# Patient Record
Sex: MALE | Race: BLACK OR AFRICAN AMERICAN | NOT HISPANIC OR LATINO | Employment: UNEMPLOYED | URBAN - METROPOLITAN AREA
[De-identification: names, ages, dates, MRNs, and addresses within clinical notes are randomized per-mention and may not be internally consistent; named-entity substitution may affect disease eponyms.]

---

## 2017-11-09 ENCOUNTER — HOSPITAL ENCOUNTER (EMERGENCY)
Facility: HOSPITAL | Age: 22
Discharge: HOME | End: 2017-11-09
Attending: EMERGENCY MEDICINE
Payer: MEDICAID

## 2017-11-09 VITALS
RESPIRATION RATE: 18 BRPM | TEMPERATURE: 99 F | BODY MASS INDEX: 20.32 KG/M2 | WEIGHT: 150 LBS | HEART RATE: 82 BPM | OXYGEN SATURATION: 100 % | SYSTOLIC BLOOD PRESSURE: 115 MMHG | DIASTOLIC BLOOD PRESSURE: 71 MMHG | HEIGHT: 72 IN

## 2017-11-09 DIAGNOSIS — S63.115A CLOSED DISLOCATION OF METACARPOPHALANGEAL JOINT OF LEFT THUMB, INITIAL ENCOUNTER: Primary | ICD-10-CM

## 2017-11-09 PROCEDURE — 63600175 PHARM REV CODE 636 W HCPCS: Performed by: NURSE PRACTITIONER

## 2017-11-09 PROCEDURE — 25000003 PHARM REV CODE 250: Performed by: EMERGENCY MEDICINE

## 2017-11-09 PROCEDURE — 99283 EMERGENCY DEPT VISIT LOW MDM: CPT | Mod: 25

## 2017-11-09 PROCEDURE — 26641 TREAT THUMB DISLOCATION: CPT | Mod: FA

## 2017-11-09 PROCEDURE — 26700 TREAT KNUCKLE DISLOCATION: CPT

## 2017-11-09 PROCEDURE — 96372 THER/PROPH/DIAG INJ SC/IM: CPT

## 2017-11-09 PROCEDURE — 25000003 PHARM REV CODE 250: Performed by: NURSE PRACTITIONER

## 2017-11-09 RX ORDER — MORPHINE SULFATE 10 MG/ML
4 INJECTION INTRAMUSCULAR; INTRAVENOUS; SUBCUTANEOUS
Status: COMPLETED | OUTPATIENT
Start: 2017-11-09 | End: 2017-11-09

## 2017-11-09 RX ORDER — HYDROCODONE BITARTRATE AND ACETAMINOPHEN 5; 325 MG/1; MG/1
1 TABLET ORAL EVERY 4 HOURS PRN
Qty: 8 TABLET | Refills: 0 | Status: SHIPPED | OUTPATIENT
Start: 2017-11-09

## 2017-11-09 RX ORDER — LIDOCAINE HYDROCHLORIDE 10 MG/ML
1 INJECTION INFILTRATION; PERINEURAL ONCE
Status: COMPLETED | OUTPATIENT
Start: 2017-11-09 | End: 2017-11-09

## 2017-11-09 RX ORDER — NAPROXEN 500 MG/1
500 TABLET ORAL 2 TIMES DAILY PRN
Qty: 20 TABLET | Refills: 0 | Status: SHIPPED | OUTPATIENT
Start: 2017-11-09

## 2017-11-09 RX ORDER — OXYCODONE AND ACETAMINOPHEN 5; 325 MG/1; MG/1
1 TABLET ORAL
Status: COMPLETED | OUTPATIENT
Start: 2017-11-09 | End: 2017-11-09

## 2017-11-09 RX ADMIN — LIDOCAINE HYDROCHLORIDE 1 ML: 10 INJECTION, SOLUTION INFILTRATION; PERINEURAL at 10:11

## 2017-11-09 RX ADMIN — MORPHINE SULFATE 4 MG: 10 INJECTION, SOLUTION INTRAMUSCULAR; INTRAVENOUS at 10:11

## 2017-11-09 RX ADMIN — OXYCODONE AND ACETAMINOPHEN 1 TABLET: 5; 325 TABLET ORAL at 09:11

## 2017-11-10 NOTE — ED PROVIDER NOTES
Encounter Date: 11/9/2017    SCRIBE #1 NOTE: I, Nilton Inge, am scribing for, and in the presence of,  Anastacio Lee NP. I have scribed the following portions of the note - Other sections scribed: HPI and ROS.       History     Chief Complaint   Patient presents with    Hand Pain     tripped on the couch and fell into a table. LEFT HAND swelling noted     CC: Hand Pain    HPI: This 22 y.o. male with no previous medical history presents to ED c/o left hand pain with associated joint swelling. Patient reports that he was in his home when he tripped on the sofa and landed on the table, impacting his thumb. No treatment attempted. No known medical allergies.      The history is provided by the patient. No  was used.     Review of patient's allergies indicates:  No Known Allergies  History reviewed. No pertinent past medical history.  History reviewed. No pertinent surgical history.  History reviewed. No pertinent family history.  Social History   Substance Use Topics    Smoking status: Never Smoker    Smokeless tobacco: Never Used    Alcohol use No     Review of Systems   Constitutional: Negative for diaphoresis and fever.   HENT: Negative for sore throat.    Eyes: Negative for visual disturbance.   Respiratory: Negative for cough and shortness of breath.    Cardiovascular: Negative for chest pain.   Gastrointestinal: Negative for abdominal pain, diarrhea, nausea and vomiting.   Genitourinary: Negative for dysuria.   Musculoskeletal: Negative for back pain.        (+) left hand pain   Skin: Negative for rash.   Neurological: Negative for weakness and headaches.   Hematological: Does not bruise/bleed easily.       Physical Exam     Initial Vitals [11/09/17 1943]   BP Pulse Resp Temp SpO2   126/64 99 18 99.5 °F (37.5 °C) 99 %      MAP       84.67         Physical Exam    Nursing note and vitals reviewed.  Constitutional: He appears well-developed and well-nourished. He is not diaphoretic. No  distress.   HENT:   Head: Normocephalic and atraumatic.   Right Ear: External ear normal.   Left Ear: External ear normal.   Nose: Nose normal.   Eyes: Conjunctivae and EOM are normal. Pupils are equal, round, and reactive to light. Right eye exhibits no discharge. Left eye exhibits no discharge. No scleral icterus.   Neck: Normal range of motion. Neck supple. No thyromegaly present. No tracheal deviation present. No JVD present.   Cardiovascular: Normal rate, regular rhythm, normal heart sounds and intact distal pulses. Exam reveals no gallop and no friction rub.    No murmur heard.  Pulmonary/Chest: Breath sounds normal. No stridor. No respiratory distress. He has no wheezes. He has no rhonchi. He has no rales. He exhibits no tenderness.   Abdominal: Soft. Bowel sounds are normal. He exhibits no distension and no mass. There is no tenderness. There is no rebound and no guarding.   Musculoskeletal: Normal range of motion. He exhibits no edema or tenderness.        Left hand: Left thumb: Exhibits deformity, swelling and tenderness.   Lymphadenopathy:     He has no cervical adenopathy.   Neurological: He is alert and oriented to person, place, and time. He has normal strength and normal reflexes. He displays normal reflexes. No cranial nerve deficit or sensory deficit.   Skin: Skin is warm and dry. No rash and no abscess noted. No erythema. No pallor.   Psychiatric: He has a normal mood and affect. His behavior is normal. Judgment and thought content normal.         ED Course   Orthopedic Injury  Date/Time: 11/9/2017 10:32 PM  Performed by: HANNA TEJADA  Authorized by: SERA ZENG     Location procedure was performed:  [facility] EMERGENCY DEPARTMENT  Assisting Provider:  SERA ZENG  Pre-operative diagnosis:  Thumb dislocation  Post-operative diagnosis:  Thumb dislocation  Consent Done?:  Yes  Universal Protocol:     Verbal consent obtained?: Yes      Risks and benefits: Risks, benefits and alternatives were  discussed      Consent given by:  Patient    Patient states understanding of procedure being performed: Yes      Patient's understanding of procedure matches consent: Yes      Procedure consent matches procedure scheduled: Yes      Required items: Required blood products, implants, devices and special equipment avialable      Patient identity confirmed:  , name, verbally with patient, MRN and provided demographic data  Injury:     Injury location:  Hand    Location details:  Left hand    Injury type:  Dislocation    Dislocation type: carpometacarpal (thumb)      Dislocation type: carpometacarpal (thumb)        Pre-procedure assessment:     Neurovascular status: Neurovascularly intact      Distal perfusion: normal      Neurological function: normal      Range of motion: reduced      Local anesthesia used?: Yes      Anesthesia:  Digital block    Local anesthetic:  Lidocaine 1% without epinephrine    Anesthetic total (ml):  7    Patient sedated?: No        Selections made in this section will also lock the Injury type section above.:     Manipulation performed?: Yes      Reduction successful?: Yes      Confirmation: Reduction confirmed by x-ray      Immobilization:  Splint    Splint type:  Thumb spica    Supplies used:  Ortho-Glass    Complications: No    Post-procedure assessment:     Neurovascular status: Neurovascularly intact      Distal perfusion: normal      Neurological function: normal      Range of motion: improved      Patient tolerance:  Patient tolerated the procedure well with no immediate complications      Labs Reviewed - No data to display          Medical Decision Making:   Differential Diagnosis:   Suspect dislocation of left thumb at MCP joint. Less likely fracture, dislocation, neurovascular compromise  Clinical Tests:   Radiological Study: Ordered and Reviewed  ED Management:  22-year-old male presenting for evaluation of a left hand injury secondary to tripping and falling over the couch at  home. Injury occurred 1 hour prior to arrival. Range of motion of the left thumb is limited at the MCP joint. There is moderate swelling on the palmar aspect of the thenar eminence. No evidence of neurovascular compromise. X-ray of the hand shows dislocation of the thumb at the MCP joint with 1 cm foreshortening. No evidence of fracture. Digital block performed. Reduction performed by myself and Dr. Sierra. Thumb spica splint applied. See procedure note. struct the patient to follow-up with orthopedic surgery. Pain is controlled at discharge. ED return precautions given. Patient expressed understanding of diagnosis and discharge instructions.  Other:   I have discussed this case with another health care provider.       <> Summary of the Discussion: Case discussed with my attending physician who agreed with the assessment and plan.            Scribe Attestation:   Scribe #1: I performed the above scribed service and the documentation accurately describes the services I performed. I attest to the accuracy of the note.    Attending Attestation:           Physician Attestation for Scribe:  Physician Attestation Statement for Scribe #1: I, Anastacio Lee NP, reviewed documentation, as scribed by Nilton Alcazar in my presence, and it is both accurate and complete.                 ED Course      Clinical Impression:   The encounter diagnosis was Closed dislocation of metacarpophalangeal joint of left thumb, initial encounter.    Disposition:   Disposition: Discharged  Condition: Stable                        Anastacio Lee NP  11/20/17 0423

## 2017-11-10 NOTE — ED TRIAGE NOTES
"Pt reports "stumbling and falling and jamming the Lt thumb on the table".  Noted disfigured left thumb with swelling.  No redness or drainage.    Pt rates pain as 10.  Denies taking any medications.  "

## 2017-11-10 NOTE — DISCHARGE INSTRUCTIONS
Follow-up with orthopedic surgery in the next 2 weeks as discussed.    Keep splint in place until follow-up as discussed.    Take pain medications as needed and only as prescribed.    Return to the emergency department for any new or worsening symptoms.

## 2021-05-14 DIAGNOSIS — Z23 NEED FOR VACCINATION: Primary | ICD-10-CM

## 2021-05-14 PROCEDURE — 91300 COVID-19, MRNA, LNP-S, PF, 30 MCG/0.3 ML DOSE VACCINE: CPT | Mod: PBBFAC

## 2021-06-02 DIAGNOSIS — Z23 NEED FOR VACCINATION: Primary | ICD-10-CM

## 2021-06-02 PROCEDURE — 0002A COVID-19, MRNA, LNP-S, PF, 30 MCG/0.3 ML DOSE VACCINE: CPT | Mod: PBBFAC

## 2021-06-02 PROCEDURE — 91300 COVID-19, MRNA, LNP-S, PF, 30 MCG/0.3 ML DOSE VACCINE: CPT | Mod: PBBFAC
